# Patient Record
Sex: MALE | Race: WHITE
[De-identification: names, ages, dates, MRNs, and addresses within clinical notes are randomized per-mention and may not be internally consistent; named-entity substitution may affect disease eponyms.]

---

## 2022-04-19 ENCOUNTER — APPOINTMENT (OUTPATIENT)
Dept: HEART AND VASCULAR | Facility: CLINIC | Age: 22
End: 2022-04-19
Payer: COMMERCIAL

## 2022-04-19 VITALS
OXYGEN SATURATION: 98 % | HEART RATE: 63 BPM | SYSTOLIC BLOOD PRESSURE: 115 MMHG | DIASTOLIC BLOOD PRESSURE: 80 MMHG | HEIGHT: 70 IN | BODY MASS INDEX: 20.76 KG/M2 | TEMPERATURE: 97.8 F | WEIGHT: 145 LBS

## 2022-04-19 DIAGNOSIS — R00.2 PALPITATIONS: ICD-10-CM

## 2022-04-19 PROBLEM — Z00.00 ENCOUNTER FOR PREVENTIVE HEALTH EXAMINATION: Status: ACTIVE | Noted: 2022-04-19

## 2022-04-19 PROCEDURE — 99204 OFFICE O/P NEW MOD 45 MIN: CPT

## 2022-04-19 PROCEDURE — 93000 ELECTROCARDIOGRAM COMPLETE: CPT

## 2022-04-20 NOTE — DISCUSSION/SUMMARY
[___ Month(s)] : in [unfilled] month(s) [FreeTextEntry1] : 20 y/o M with PMHx of "tachycardia", has had on and off since age 13. Had improved but has not recurred, especially worse over last 2-3 weeks.\par \par # Palpitations\par # Chest tightness\par Will order 1 week Event monitor, symptoms occurring daily as per patient\par Echo to r/o structural heart disease\par EST to evaluate chest tightness and arrhythmia with exertion\par EKG ordered today

## 2022-04-20 NOTE — HISTORY OF PRESENT ILLNESS
[FreeTextEntry1] : 22 y/o M with PMHx of "tachycardia", has had on and off since age 13. Had improved but has not recurred, especially worse over last 2-3 weeks.\par \par No other notable medical Hx\par \par Reports having frequent palpitations a/w chest tightness. Monitors HR on Apple watch and can suddenly increase to 130 without activity and then drops below 30 at times as well.\par Had ER visit and was monitored, nothing found on work up\par \par EKG 4/19/2022: NSR

## 2022-05-03 ENCOUNTER — NON-APPOINTMENT (OUTPATIENT)
Age: 22
End: 2022-05-03